# Patient Record
Sex: MALE | Race: BLACK OR AFRICAN AMERICAN | NOT HISPANIC OR LATINO
[De-identification: names, ages, dates, MRNs, and addresses within clinical notes are randomized per-mention and may not be internally consistent; named-entity substitution may affect disease eponyms.]

---

## 2023-01-19 ENCOUNTER — P2P PATIENT RECORD (OUTPATIENT)
Age: 38
End: 2023-01-19

## 2023-01-24 ENCOUNTER — OFFICE VISIT (OUTPATIENT)
Dept: URBAN - METROPOLITAN AREA CLINIC 29 | Facility: CLINIC | Age: 38
End: 2023-01-24
Payer: COMMERCIAL

## 2023-01-24 ENCOUNTER — WEB ENCOUNTER (OUTPATIENT)
Dept: URBAN - METROPOLITAN AREA CLINIC 27 | Facility: CLINIC | Age: 38
End: 2023-01-24

## 2023-01-24 ENCOUNTER — LAB OUTSIDE AN ENCOUNTER (OUTPATIENT)
Dept: URBAN - METROPOLITAN AREA CLINIC 27 | Facility: CLINIC | Age: 38
End: 2023-01-24

## 2023-01-24 VITALS
SYSTOLIC BLOOD PRESSURE: 129 MMHG | HEIGHT: 65 IN | DIASTOLIC BLOOD PRESSURE: 83 MMHG | BODY MASS INDEX: 36.75 KG/M2 | HEART RATE: 77 BPM | WEIGHT: 220.6 LBS

## 2023-01-24 DIAGNOSIS — K75.9 HEPATITIS: ICD-10-CM

## 2023-01-24 DIAGNOSIS — R74.01 ELEVATED TRANSAMINASE LEVEL: ICD-10-CM

## 2023-01-24 DIAGNOSIS — R10.13 EPIGASTRIC ABDOMINAL PAIN: ICD-10-CM

## 2023-01-24 PROCEDURE — 99204 OFFICE O/P NEW MOD 45 MIN: CPT | Performed by: INTERNAL MEDICINE

## 2023-01-24 NOTE — HPI-TODAY'S VISIT:
Mr. Villasenor is a 37-year-old man who presents for evaluation elevated LFT.  He  developed dark urine one week ago  and was seen in the ER.  He was found to have ALT  124 AST 56, Bilirubin 6.2.  He does not have a history of significant alcohol use, no recreation drug use, he has a tattoo obtained in someones house.  He has not had a blood transfusion  He developed arthritis and joint swelling 8 months ago.  His wife states 4 years ago he was told of elevated liver enzymes.

## 2023-01-24 NOTE — PHYSICAL EXAM GASTROINTESTINAL
Abdomen , soft,  nondistended , no guarding or rigidity , no masses palpable , normal bowel sounds , Liver and Spleen , no hepatomegaly present , no hepatosplenomegaly , liver nontender , spleen not palpable  , epigastrium tenderness

## 2023-02-06 ENCOUNTER — TELEPHONE ENCOUNTER (OUTPATIENT)
Dept: URBAN - METROPOLITAN AREA CLINIC 29 | Facility: CLINIC | Age: 38
End: 2023-02-06

## 2023-02-06 LAB
A/G RATIO: 1.6
ABSOLUTE BASOPHILS: 40
ABSOLUTE EOSINOPHILS: 114
ABSOLUTE LYMPHOCYTES: 1816
ABSOLUTE MONOCYTES: 442
ABSOLUTE NEUTROPHILS: 4288
AFP, SERUM, TUMOR MARKER: 2.2
ALBUMIN: 4.3
ALKALINE PHOSPHATASE: 94
ALPHA-1-ANTITRYPSIN QN: 220
ALT (SGPT): 68
ANA SCREEN, IFA: NEGATIVE
AST (SGOT): 20
BASOPHILS: 0.6
BILIRUBIN, TOTAL: 1
BUN/CREATININE RATIO: (no result)
BUN: 11
CALCIUM: 9.7
CARBON DIOXIDE, TOTAL: 28
CERULOPLASMIN: 32
CHLORIDE: 103
CREATININE: 1.2
EGFR: 80
EOSINOPHILS: 1.7
GLOBULIN, TOTAL: 2.7
GLUCOSE: 104
H PYLORI BREATH TEST: NOT DETECTED
H. PYLORI BREATH TEST: NOT DETECTED
HCV RNA, QUANTITATIVE: <1.18
HCV RNA, QUANTITATIVE: <15
HEMATOCRIT: 45.9
HEMOGLOBIN: 15.5
HEPATITIS A AB, TOTAL: (no result)
HEPATITIS B SURFACE AB IMMUNITY, QN: 32
HEPATITIS C VIRAL RNA: NOT DETECTED
INR: 1
INTERPRETATION: NOT DETECTED
LYMPHOCYTES: 27.1
MCH: 29.4
MCHC: 33.8
MCV: 87.1
MITOCHONDRIAL AB SCREEN: NEGATIVE
MONOCYTES: 6.6
MPV: 10.3
NEUTROPHILS: 64
PLATELET COUNT: 358
POTASSIUM: 4.7
PROTEIN, TOTAL: 7
PT: 10.3
RDW: 12.9
RED BLOOD CELL COUNT: 5.27
SMOOTH MUSCLE AB SCREEN: NEGATIVE
SODIUM: 139
WHITE BLOOD CELL COUNT: 6.7

## 2023-02-15 ENCOUNTER — OFFICE VISIT (OUTPATIENT)
Dept: URBAN - METROPOLITAN AREA CLINIC 29 | Facility: CLINIC | Age: 38
End: 2023-02-15
Payer: COMMERCIAL

## 2023-02-15 VITALS
BODY MASS INDEX: 36.65 KG/M2 | HEIGHT: 65 IN | WEIGHT: 220 LBS | SYSTOLIC BLOOD PRESSURE: 128 MMHG | DIASTOLIC BLOOD PRESSURE: 86 MMHG | HEART RATE: 88 BPM | RESPIRATION RATE: 17 BRPM

## 2023-02-15 DIAGNOSIS — K75.9 HEPATITIS: ICD-10-CM

## 2023-02-15 PROCEDURE — 99213 OFFICE O/P EST LOW 20 MIN: CPT | Performed by: INTERNAL MEDICINE

## 2023-02-15 NOTE — HPI-SCREENING
Mr. Villasenor is a 37 year old man who is being evaluated for elevated transaminases.  The hepatitis work up thus far.  His ulrasound is pending.

## 2023-02-20 ENCOUNTER — DASHBOARD ENCOUNTERS (OUTPATIENT)
Age: 38
End: 2023-02-20

## 2023-02-20 PROBLEM — 128241005 HEPATITIS: Status: ACTIVE | Noted: 2023-02-06

## 2023-02-27 ENCOUNTER — TELEPHONE ENCOUNTER (OUTPATIENT)
Dept: URBAN - METROPOLITAN AREA CLINIC 27 | Facility: CLINIC | Age: 38
End: 2023-02-27